# Patient Record
Sex: MALE | Race: WHITE | URBAN - METROPOLITAN AREA
[De-identification: names, ages, dates, MRNs, and addresses within clinical notes are randomized per-mention and may not be internally consistent; named-entity substitution may affect disease eponyms.]

---

## 2022-11-04 ENCOUNTER — OFFICE (OUTPATIENT)
Dept: URBAN - METROPOLITAN AREA CLINIC 8 | Facility: CLINIC | Age: 87
Setting detail: OPHTHALMOLOGY
End: 2022-11-04
Payer: MEDICARE

## 2022-11-04 DIAGNOSIS — H02.834: ICD-10-CM

## 2022-11-04 DIAGNOSIS — H52.4: ICD-10-CM

## 2022-11-04 DIAGNOSIS — H35.363: ICD-10-CM

## 2022-11-04 DIAGNOSIS — H02.831: ICD-10-CM

## 2022-11-04 DIAGNOSIS — Z96.1: ICD-10-CM

## 2022-11-04 DIAGNOSIS — H26.493: ICD-10-CM

## 2022-11-04 PROCEDURE — 92015 DETERMINE REFRACTIVE STATE: CPT | Performed by: OPHTHALMOLOGY

## 2022-11-04 PROCEDURE — 92134 CPTRZ OPH DX IMG PST SGM RTA: CPT | Performed by: OPHTHALMOLOGY

## 2022-11-04 PROCEDURE — 92014 COMPRE OPH EXAM EST PT 1/>: CPT | Performed by: OPHTHALMOLOGY

## 2022-11-04 ASSESSMENT — REFRACTION_MANIFEST
OD_VA1: 20/25
OD_AXIS: 105
OD_CYLINDER: -3.50
OS_ADD: +2.75
OS_AXIS: 085
OD_SPHERE: +1.25
OS_CYLINDER: -4.50
OS_VA2: 20/20(J1+)
OS_ADD: +2.75
OD_VA2: 20/20(J1+)
OS_CYLINDER: -4.50
OD_CYLINDER: -3.50
OD_VA2: 20/20(J1+)
OS_SPHERE: +2.25
OS_VA1: 20/25-2
OD_AXIS: 105
OD_SPHERE: +1.25
OU_VA: 20/25-
OD_VA1: 20/25
OS_AXIS: 085
OS_VA2: 20/20(J1+)
OS_VA1: 20/25-2
OD_ADD: +2.75
OS_SPHERE: +2.25
OU_VA: 20/25-
OD_ADD: +2.75

## 2022-11-04 ASSESSMENT — SPHEQUIV_DERIVED
OS_SPHEQUIV: 0
OD_SPHEQUIV: -0.5
OD_SPHEQUIV: -0.5
OD_SPHEQUIV: -0.375
OS_SPHEQUIV: 0
OS_SPHEQUIV: 0.125

## 2022-11-04 ASSESSMENT — REFRACTION_CURRENTRX
OS_CYLINDER: -4.00
OS_AXIS: 085
OS_SPHERE: +2.25
OD_SPHERE: +1.25
OD_OVR_VA: 20/
OD_VPRISM_DIRECTION: PROGS
OD_AXIS: 097
OD_CYLINDER: -3.00
OS_ADD: +2.75
OD_ADD: +2.75
OS_VPRISM_DIRECTION: PROGS
OS_OVR_VA: 20/

## 2022-11-04 ASSESSMENT — CONFRONTATIONAL VISUAL FIELD TEST (CVF)
OD_FINDINGS: FULL
OS_FINDINGS: FULL

## 2022-11-04 ASSESSMENT — AXIALLENGTH_DERIVED
OD_AL: 23.3305
OD_AL: 23.283
OS_AL: 23.4522
OS_AL: 23.5004
OD_AL: 23.3305
OS_AL: 23.5004

## 2022-11-04 ASSESSMENT — REFRACTION_AUTOREFRACTION
OD_CYLINDER: -4.25
OS_CYLINDER: -5.75
OS_AXIS: 083
OS_SPHERE: +3.00
OD_AXIS: 105
OD_SPHERE: +1.75

## 2022-11-04 ASSESSMENT — LID POSITION - DERMATOCHALASIS
OS_DERMATOCHALASIS: LUL 3+
OD_DERMATOCHALASIS: RUL 3+

## 2022-11-04 ASSESSMENT — KERATOMETRY
OS_K2POWER_DIOPTERS: 46.25
OS_AXISANGLE_DEGREES: 177
OD_K1POWER_DIOPTERS: 43.50
OD_K2POWER_DIOPTERS: 46.00
OD_AXISANGLE_DEGREES: 014
OS_K1POWER_DIOPTERS: 41.25

## 2022-11-04 ASSESSMENT — VISUAL ACUITY
OD_BCVA: 20/40-2
OS_BCVA: 20/30-

## 2023-11-30 ENCOUNTER — OFFICE (OUTPATIENT)
Dept: URBAN - METROPOLITAN AREA CLINIC 8 | Facility: CLINIC | Age: 88
Setting detail: OPHTHALMOLOGY
End: 2023-11-30
Payer: MEDICARE

## 2023-11-30 DIAGNOSIS — H02.834: ICD-10-CM

## 2023-11-30 DIAGNOSIS — Z96.1: ICD-10-CM

## 2023-11-30 DIAGNOSIS — H26.493: ICD-10-CM

## 2023-11-30 DIAGNOSIS — H35.363: ICD-10-CM

## 2023-11-30 DIAGNOSIS — H02.831: ICD-10-CM

## 2023-11-30 DIAGNOSIS — H40.013: ICD-10-CM

## 2023-11-30 PROCEDURE — 92134 CPTRZ OPH DX IMG PST SGM RTA: CPT | Performed by: OPHTHALMOLOGY

## 2023-11-30 PROCEDURE — 92014 COMPRE OPH EXAM EST PT 1/>: CPT | Performed by: OPHTHALMOLOGY

## 2023-11-30 ASSESSMENT — LID POSITION - DERMATOCHALASIS
OS_DERMATOCHALASIS: LUL 3+
OD_DERMATOCHALASIS: RUL 3+

## 2023-11-30 ASSESSMENT — REFRACTION_MANIFEST
OD_AXIS: 105
OS_CYLINDER: -4.50
OS_CYLINDER: -4.50
OD_VA2: 20/25(J1)
OS_ADD: +2.75
OD_VA1: 20/30
OD_CYLINDER: -3.50
OS_SPHERE: +2.25
OS_VA2: 20/25(J1)
OD_VA2: 20/20(J1+)
OD_CYLINDER: -3.50
OD_VA1: 20/25
OU_VA: 20/25-
OS_VA1: 20/25-2
OS_SPHERE: +2.25
OS_VA1: 20/30
OS_VA2: 20/20(J1+)
OD_SPHERE: +1.25
OD_ADD: +2.75
OS_AXIS: 085
OD_ADD: +2.75
OU_VA: 20/30
OS_ADD: +2.75
OD_AXIS: 105
OD_SPHERE: +1.25
OS_AXIS: 085

## 2023-11-30 ASSESSMENT — SPHEQUIV_DERIVED
OD_SPHEQUIV: -0.5
OS_SPHEQUIV: 0
OD_SPHEQUIV: -0.5
OD_SPHEQUIV: -0.25

## 2023-11-30 ASSESSMENT — REFRACTION_CURRENTRX
OD_OVR_VA: 20/
OS_SPHERE: +2.25
OS_OVR_VA: 20/
OS_ADD: +2.00
OD_ADD: +2.00
OS_AXIS: 081
OS_CYLINDER: -4.25
OD_VPRISM_DIRECTION: PROGS
OS_VPRISM_DIRECTION: PROGS
OD_CYLINDER: -3.75
OD_SPHERE: +1.75
OD_AXIS: 109

## 2023-11-30 ASSESSMENT — REFRACTION_AUTOREFRACTION
OS_SPHERE: +2.75
OS_CYLINDER: -5.50
OD_CYLINDER: -4.00
OD_SPHERE: +1.75
OD_AXIS: 107
OS_AXIS: 086

## 2023-11-30 ASSESSMENT — CONFRONTATIONAL VISUAL FIELD TEST (CVF)
OS_FINDINGS: FULL
OD_FINDINGS: FULL

## 2024-01-10 PROBLEM — Z00.00 ENCOUNTER FOR PREVENTIVE HEALTH EXAMINATION: Status: ACTIVE | Noted: 2024-01-10

## 2024-02-14 ENCOUNTER — APPOINTMENT (OUTPATIENT)
Dept: ORTHOPEDIC SURGERY | Facility: CLINIC | Age: 89
End: 2024-02-14
Payer: MEDICARE

## 2024-02-14 DIAGNOSIS — Z78.9 OTHER SPECIFIED HEALTH STATUS: ICD-10-CM

## 2024-02-14 DIAGNOSIS — M94.262 CHONDROMALACIA, LEFT KNEE: ICD-10-CM

## 2024-02-14 DIAGNOSIS — S83.412A SPRAIN OF MEDIAL COLLATERAL LIGAMENT OF LEFT KNEE, INITIAL ENCOUNTER: ICD-10-CM

## 2024-02-14 PROCEDURE — 99204 OFFICE O/P NEW MOD 45 MIN: CPT

## 2024-02-14 PROCEDURE — 73562 X-RAY EXAM OF KNEE 3: CPT | Mod: LT

## 2024-02-14 RX ORDER — DOFETILIDE 0.5 MG/1
CAPSULE ORAL
Refills: 0 | Status: ACTIVE | COMMUNITY

## 2024-02-14 RX ORDER — TAMSULOSIN HYDROCHLORIDE 0.4 MG/1
CAPSULE ORAL
Refills: 0 | Status: ACTIVE | COMMUNITY

## 2024-02-14 RX ORDER — APIXABAN 5 MG/1
TABLET, FILM COATED ORAL
Refills: 0 | Status: ACTIVE | COMMUNITY

## 2024-02-14 RX ORDER — ROSUVASTATIN CALCIUM 5 MG/1
TABLET, FILM COATED ORAL
Refills: 0 | Status: ACTIVE | COMMUNITY

## 2024-02-14 NOTE — DISCUSSION/SUMMARY
[de-identified] : We discussed formal PT, a home exercise program, ice therapy and the role of NSAIDS for the pain. These modalities will improve the patient's functionality in their activities of daily life.  Risks, benefits and contraindications were discussed.  The patient will follow up in 6 weeks or sooner as needed.

## 2024-02-14 NOTE — PHYSICAL EXAM
[Normal Coordination] : normal coordination [Normal Sensation] : normal sensation [Oriented] : oriented [Able to Communicate] : able to communicate [Well Developed] : well developed [4___] : hamstring 4[unfilled]/5 [] : patient ambulates without assistive device [Left] : left knee [Lateral] : lateral [[place]] : skyline [AP Standing] : anteroposterior standing [There are no fractures, subluxations or dislocations. No significant abnormalities are seen] : There are no fractures, subluxations or dislocations. No significant abnormalities are seen [[id number]] : flexion 120 degrees

## 2024-02-14 NOTE — HISTORY OF PRESENT ILLNESS
[de-identified] : Patient reports an episode of pain when he turned the foot about 6 weeks ago. The pain is in the medial knee.

## 2024-03-20 ENCOUNTER — APPOINTMENT (OUTPATIENT)
Dept: ORTHOPEDIC SURGERY | Facility: CLINIC | Age: 89
End: 2024-03-20
Payer: MEDICARE

## 2024-03-20 DIAGNOSIS — M17.12 UNILATERAL PRIMARY OSTEOARTHRITIS, LEFT KNEE: ICD-10-CM

## 2024-03-20 PROCEDURE — 99213 OFFICE O/P EST LOW 20 MIN: CPT

## 2024-03-20 NOTE — DISCUSSION/SUMMARY
[de-identified] : We discussed continued formal PT, a home exercise program, ice therapy and the role of NSAIDS for the pain. These modalities will improve the patient's functionality in their activities of daily life.  Risks, benefits and contraindications were discussed.  The patient will follow up in 6 weeks or sooner as needed.

## 2024-03-20 NOTE — PHYSICAL EXAM
[Normal Coordination] : normal coordination [Normal Sensation] : normal sensation [Oriented] : oriented [Able to Communicate] : able to communicate [Well Developed] : well developed [4___] : hamstring 4[unfilled]/5 [Left] : left knee [Lateral] : lateral [[place]] : skyline [There are no fractures, subluxations or dislocations. No significant abnormalities are seen] : There are no fractures, subluxations or dislocations. No significant abnormalities are seen [AP Standing] : anteroposterior standing [] : patella maltracking [[email or web address]] : flexion 120 degrees

## 2024-06-07 ENCOUNTER — OFFICE (OUTPATIENT)
Dept: URBAN - METROPOLITAN AREA CLINIC 8 | Facility: CLINIC | Age: 89
Setting detail: OPHTHALMOLOGY
End: 2024-06-07
Payer: MEDICARE

## 2024-06-07 DIAGNOSIS — H02.834: ICD-10-CM

## 2024-06-07 DIAGNOSIS — H40.013: ICD-10-CM

## 2024-06-07 DIAGNOSIS — H02.831: ICD-10-CM

## 2024-06-07 DIAGNOSIS — H26.493: ICD-10-CM

## 2024-06-07 DIAGNOSIS — Z96.1: ICD-10-CM

## 2024-06-07 PROCEDURE — 76514 ECHO EXAM OF EYE THICKNESS: CPT | Performed by: OPHTHALMOLOGY

## 2024-06-07 PROCEDURE — 92133 CPTRZD OPH DX IMG PST SGM ON: CPT | Performed by: OPHTHALMOLOGY

## 2024-06-07 PROCEDURE — 99213 OFFICE O/P EST LOW 20 MIN: CPT | Performed by: OPHTHALMOLOGY

## 2024-06-07 ASSESSMENT — LID POSITION - DERMATOCHALASIS
OD_DERMATOCHALASIS: RUL 3+
OS_DERMATOCHALASIS: LUL 3+

## 2024-06-07 ASSESSMENT — CONFRONTATIONAL VISUAL FIELD TEST (CVF)
OD_FINDINGS: FULL
OS_FINDINGS: FULL

## 2024-12-12 ENCOUNTER — OFFICE (OUTPATIENT)
Dept: URBAN - METROPOLITAN AREA CLINIC 8 | Facility: CLINIC | Age: 89
Setting detail: OPHTHALMOLOGY
End: 2024-12-12
Payer: MEDICARE

## 2024-12-12 DIAGNOSIS — H35.363: ICD-10-CM

## 2024-12-12 DIAGNOSIS — Z96.1: ICD-10-CM

## 2024-12-12 DIAGNOSIS — H40.1131: ICD-10-CM

## 2024-12-12 DIAGNOSIS — H02.831: ICD-10-CM

## 2024-12-12 DIAGNOSIS — H02.834: ICD-10-CM

## 2024-12-12 DIAGNOSIS — H26.493: ICD-10-CM

## 2024-12-12 PROCEDURE — 92083 EXTENDED VISUAL FIELD XM: CPT | Performed by: OPHTHALMOLOGY

## 2024-12-12 PROCEDURE — 92134 CPTRZ OPH DX IMG PST SGM RTA: CPT | Performed by: OPHTHALMOLOGY

## 2024-12-12 PROCEDURE — 92014 COMPRE OPH EXAM EST PT 1/>: CPT | Performed by: OPHTHALMOLOGY

## 2024-12-12 ASSESSMENT — LID POSITION - DERMATOCHALASIS
OS_DERMATOCHALASIS: LUL 3+
OD_DERMATOCHALASIS: RUL 3+

## 2024-12-12 ASSESSMENT — REFRACTION_CURRENTRX
OS_VPRISM_DIRECTION: PROGS
OD_AXIS: 106
OS_CYLINDER: -4.25
OD_OVR_VA: 20/
OD_SPHERE: +1.50
OD_VPRISM_DIRECTION: PROGS
OS_VPRISM_DIRECTION: PROGS
OS_OVR_VA: 20/
OD_CYLINDER: -3.75
OD_ADD: +2.00
OS_SPHERE: +2.50
OS_ADD: +2.00
OD_CYLINDER: -3.50
OS_OVR_VA: 20/
OD_OVR_VA: 20/
OS_SPHERE: +2.25
OS_AXIS: 077
OD_ADD: +2.25
OD_VPRISM_DIRECTION: PROGS
OS_CYLINDER: -4.25
OD_SPHERE: +1.75
OS_AXIS: 081
OS_ADD: +2.25
OD_AXIS: 109

## 2024-12-12 ASSESSMENT — REFRACTION_AUTOREFRACTION
OD_SPHERE: +3.50
OS_SPHERE: +3.25
OD_CYLINDER: -5.50
OS_AXIS: 082
OD_AXIS: 100
OS_CYLINDER: -5.25

## 2024-12-12 ASSESSMENT — REFRACTION_MANIFEST
OD_SPHERE: +1.25
OS_CYLINDER: -4.50
OU_VA: 20/25-
OD_AXIS: 105
OS_ADD: +2.75
OD_CYLINDER: -3.50
OS_ADD: +2.75
OS_VA1: 20/25-2
OU_VA: 20/30
OD_ADD: +2.75
OD_VA2: 20/25(J1)
OD_VA1: 20/30
OD_CYLINDER: -3.50
OD_ADD: +2.75
OS_VA1: 20/30
OD_SPHERE: +1.25
OD_VA1: 20/25
OS_VA2: 20/20(J1+)
OS_SPHERE: +2.25
OD_AXIS: 105
OS_VA2: 20/25(J1)
OS_AXIS: 085
OD_VA2: 20/20(J1+)
OS_CYLINDER: -4.50
OS_SPHERE: +2.25
OS_AXIS: 085

## 2024-12-12 ASSESSMENT — KERATOMETRY
OD_AXISANGLE_DEGREES: 014
OS_K2POWER_DIOPTERS: 46.50
OS_AXISANGLE_DEGREES: 176
OD_K1POWER_DIOPTERS: 44.00
OD_K2POWER_DIOPTERS: 46.25
OS_K1POWER_DIOPTERS: 41.25

## 2024-12-12 ASSESSMENT — PACHYMETRY
OS_CT_UM: 555
OS_CT_CORRECTION: -1

## 2024-12-12 ASSESSMENT — CONFRONTATIONAL VISUAL FIELD TEST (CVF)
OD_FINDINGS: FULL
OS_FINDINGS: FULL

## 2024-12-12 ASSESSMENT — VISUAL ACUITY
OD_BCVA: 20/40+
OS_BCVA: 20/40-2

## 2024-12-12 ASSESSMENT — TONOMETRY: OD_IOP_MMHG: 19

## 2025-03-13 ENCOUNTER — OFFICE (OUTPATIENT)
Dept: URBAN - METROPOLITAN AREA CLINIC 8 | Facility: CLINIC | Age: 89
Setting detail: OPHTHALMOLOGY
End: 2025-03-13
Payer: MEDICARE

## 2025-03-13 ENCOUNTER — RX ONLY (RX ONLY)
Age: 89
End: 2025-03-13

## 2025-03-13 DIAGNOSIS — H02.831: ICD-10-CM

## 2025-03-13 DIAGNOSIS — Z96.1: ICD-10-CM

## 2025-03-13 DIAGNOSIS — H02.834: ICD-10-CM

## 2025-03-13 DIAGNOSIS — H35.363: ICD-10-CM

## 2025-03-13 DIAGNOSIS — H40.1131: ICD-10-CM

## 2025-03-13 DIAGNOSIS — H04.123: ICD-10-CM

## 2025-03-13 DIAGNOSIS — H26.493: ICD-10-CM

## 2025-03-13 PROCEDURE — 99213 OFFICE O/P EST LOW 20 MIN: CPT | Performed by: OPHTHALMOLOGY

## 2025-03-13 ASSESSMENT — CONFRONTATIONAL VISUAL FIELD TEST (CVF)
OS_FINDINGS: FULL
OD_FINDINGS: FULL

## 2025-03-13 ASSESSMENT — REFRACTION_MANIFEST
OS_SPHERE: +2.25
OS_ADD: +2.75
OD_AXIS: 105
OU_VA: 20/25-
OS_VA1: 20/25-2
OS_SPHERE: +2.25
OU_VA: 20/30
OD_SPHERE: +1.25
OD_VA2: 20/25(J1)
OS_VA2: 20/20(J1+)
OD_VA1: 20/25
OD_VA2: 20/20(J1+)
OD_CYLINDER: -3.50
OS_CYLINDER: -4.50
OD_ADD: +2.75
OS_VA2: 20/25(J1)
OS_AXIS: 085
OD_VA1: 20/30
OS_VA1: 20/30
OS_AXIS: 085
OD_SPHERE: +1.25
OS_CYLINDER: -4.50
OD_CYLINDER: -3.50
OD_AXIS: 105
OS_ADD: +2.75
OD_ADD: +2.75

## 2025-03-13 ASSESSMENT — REFRACTION_CURRENTRX
OD_AXIS: 109
OD_CYLINDER: -3.75
OD_VPRISM_DIRECTION: PROGS
OS_CYLINDER: -4.25
OD_ADD: +2.25
OD_OVR_VA: 20/
OS_AXIS: 081
OD_CYLINDER: -3.50
OS_ADD: +2.25
OD_SPHERE: +1.50
OS_SPHERE: +2.50
OS_OVR_VA: 20/
OS_CYLINDER: -4.25
OS_AXIS: 077
OS_OVR_VA: 20/
OS_VPRISM_DIRECTION: PROGS
OS_ADD: +2.00
OS_SPHERE: +2.25
OD_VPRISM_DIRECTION: PROGS
OD_ADD: +2.00
OD_OVR_VA: 20/
OD_AXIS: 106
OD_SPHERE: +1.75
OS_VPRISM_DIRECTION: PROGS

## 2025-03-13 ASSESSMENT — TEAR BREAK UP TIME (TBUT)
OS_TBUT: 2+
OD_TBUT: 2+

## 2025-03-13 ASSESSMENT — PACHYMETRY
OS_CT_CORRECTION: -1
OS_CT_UM: 555

## 2025-03-13 ASSESSMENT — REFRACTION_AUTOREFRACTION
OS_AXIS: 081
OD_CYLINDER: -4.75
OS_SPHERE: +3.25
OD_AXIS: 102
OD_SPHERE: +2.25
OS_CYLINDER: -6.00

## 2025-03-13 ASSESSMENT — KERATOMETRY
OS_AXISANGLE_DEGREES: 177
OS_K1POWER_DIOPTERS: 41.25
OD_AXISANGLE_DEGREES: 009
OS_K2POWER_DIOPTERS: 46.50
OD_K1POWER_DIOPTERS: 43.75
OD_K2POWER_DIOPTERS: 46.50

## 2025-03-13 ASSESSMENT — TONOMETRY
OD_IOP_MMHG: 14
OS_IOP_MMHG: 15

## 2025-03-13 ASSESSMENT — LID POSITION - DERMATOCHALASIS
OS_DERMATOCHALASIS: LUL 3+
OD_DERMATOCHALASIS: RUL 3+

## 2025-03-13 ASSESSMENT — VISUAL ACUITY
OS_BCVA: 20/50
OD_BCVA: 20/40